# Patient Record
Sex: MALE
[De-identification: names, ages, dates, MRNs, and addresses within clinical notes are randomized per-mention and may not be internally consistent; named-entity substitution may affect disease eponyms.]

---

## 2019-05-26 ENCOUNTER — TRANSCRIPTION ENCOUNTER (OUTPATIENT)
Age: 44
End: 2019-05-26

## 2022-08-12 PROBLEM — Z00.00 ENCOUNTER FOR PREVENTIVE HEALTH EXAMINATION: Status: ACTIVE | Noted: 2022-08-12

## 2022-08-15 ENCOUNTER — APPOINTMENT (OUTPATIENT)
Dept: ORTHOPEDIC SURGERY | Facility: CLINIC | Age: 47
End: 2022-08-15
Payer: COMMERCIAL

## 2022-08-15 VITALS — WEIGHT: 180 LBS | BODY MASS INDEX: 28.93 KG/M2 | HEIGHT: 66 IN

## 2022-08-15 DIAGNOSIS — M17.11 UNILATERAL PRIMARY OSTEOARTHRITIS, RIGHT KNEE: ICD-10-CM

## 2022-08-15 DIAGNOSIS — M79.18 MYALGIA, OTHER SITE: ICD-10-CM

## 2022-08-15 DIAGNOSIS — Z86.79 PERSONAL HISTORY OF OTHER DISEASES OF THE CIRCULATORY SYSTEM: ICD-10-CM

## 2022-08-15 DIAGNOSIS — M17.12 UNILATERAL PRIMARY OSTEOARTHRITIS, LEFT KNEE: ICD-10-CM

## 2022-08-15 PROCEDURE — J3490M: CUSTOM

## 2022-08-15 PROCEDURE — 20610 DRAIN/INJ JOINT/BURSA W/O US: CPT | Mod: 50

## 2022-08-15 PROCEDURE — 73564 X-RAY EXAM KNEE 4 OR MORE: CPT | Mod: 50

## 2022-08-15 PROCEDURE — 99204 OFFICE O/P NEW MOD 45 MIN: CPT | Mod: 25

## 2022-08-15 NOTE — ASSESSMENT
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 \par - We also discussed the possible of a corticosteroid injection in order to help decrease inflammation and pain so that they can perform better therapy.\par - The risks, benefits, and alternatives to corticosteroid injection were reviewed with the patient and they wished to proceed with this treatment course. \par - Follow up as needed in 6 weeks to re-evaluate, if no improvement we spoke about possibility of viscosupplementation injections\par

## 2022-08-15 NOTE — IMAGING

## 2022-08-15 NOTE — PROCEDURE
[FreeTextEntry3] : \par Injection Procedure Note:\par \par The risks, benefits, and alternatives to corticosteroid injection were reviewed with the patient.  Risks outlined include but are not limited to infection, sepsis, bleeding, scarring, skin discoloration, temporary increase in pain, syncopal episode, failure to resolve symptoms, symptoms recurrence, allergic reaction, flare reaction, and elevation of blood sugar in diabetics.  Patient understood the risks and asked to proceed with this treatment course.\par \par Patient Identification\par Name/: Verbal with patient and/or family\par \par Procedure Verification:\par Procedure confirmed with patient or family/designee\par Consent for procedure: Verbal Consent Given\par Relevant documentation completed, reviewed, and signed\par Clinical indications for procedure confirmed\par \par Time-out with all members of procedure team immediately prior to procedure:\par Correct patient identified. Agreement on procedure. Correct side and site.\par \par KNEE INJECTION (STEROID) - BILATERAL\par After verbal consent and identification of the correct patient and correct site, the superolateral bilateral knees was prepped using alcohol swabs and betadine. This was allowed time to air dry. A mixture of 1cc DepoMedrol 40mg/ml, 3cc Lidocaine 1%, and 3cc Bupivacaine 0.5% was injected into the suprapatellar pouch using a sterile 22G needle after ethyl chloride spray for skin anesthesia. The patient tolerated the procedure well. After-care instructions were provided and included instructions to ice the area and to call if redness, pain, or fever develop.\par 
feli gant

## 2022-08-15 NOTE — HISTORY OF PRESENT ILLNESS
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